# Patient Record
Sex: MALE | Race: WHITE
[De-identification: names, ages, dates, MRNs, and addresses within clinical notes are randomized per-mention and may not be internally consistent; named-entity substitution may affect disease eponyms.]

---

## 2017-06-02 ENCOUNTER — HOSPITAL ENCOUNTER (OUTPATIENT)
Dept: HOSPITAL 35 - RAD | Age: 72
End: 2017-06-02
Attending: INTERNAL MEDICINE
Payer: COMMERCIAL

## 2017-06-02 DIAGNOSIS — K46.9: Primary | ICD-10-CM

## 2017-06-02 DIAGNOSIS — K76.0: ICD-10-CM

## 2017-06-02 DIAGNOSIS — N20.0: ICD-10-CM

## 2017-07-21 ENCOUNTER — HOSPITAL ENCOUNTER (INPATIENT)
Dept: HOSPITAL 35 - TBA | Age: 72
LOS: 6 days | Discharge: HOME | DRG: 337 | End: 2017-07-27
Attending: SURGERY | Admitting: SURGERY
Payer: COMMERCIAL

## 2017-07-21 VITALS — DIASTOLIC BLOOD PRESSURE: 79 MMHG | SYSTOLIC BLOOD PRESSURE: 142 MMHG

## 2017-07-21 VITALS — SYSTOLIC BLOOD PRESSURE: 135 MMHG | DIASTOLIC BLOOD PRESSURE: 77 MMHG

## 2017-07-21 VITALS — BODY MASS INDEX: 34.3 KG/M2 | WEIGHT: 205.9 LBS | HEIGHT: 65 IN

## 2017-07-21 VITALS — DIASTOLIC BLOOD PRESSURE: 72 MMHG | SYSTOLIC BLOOD PRESSURE: 136 MMHG

## 2017-07-21 VITALS — SYSTOLIC BLOOD PRESSURE: 137 MMHG | DIASTOLIC BLOOD PRESSURE: 72 MMHG

## 2017-07-21 DIAGNOSIS — I10: ICD-10-CM

## 2017-07-21 DIAGNOSIS — E66.9: ICD-10-CM

## 2017-07-21 DIAGNOSIS — K21.9: ICD-10-CM

## 2017-07-21 DIAGNOSIS — K43.0: Primary | ICD-10-CM

## 2017-07-21 LAB
ANION GAP SERPL CALC-SCNC: 7 MMOL/L (ref 7–16)
APTT BLD: 27.3 SECONDS (ref 24.5–32.8)
BUN SERPL-MCNC: 18 MG/DL (ref 7–18)
CALCIUM SERPL-MCNC: 8.7 MG/DL (ref 8.5–10.1)
CHLORIDE SERPL-SCNC: 105 MMOL/L (ref 98–107)
CO2 SERPL-SCNC: 29 MMOL/L (ref 21–32)
CREAT SERPL-MCNC: 1.4 MG/DL (ref 0.7–1.3)
ERYTHROCYTE [DISTWIDTH] IN BLOOD BY AUTOMATED COUNT: 14.1 % (ref 10.5–14.5)
GLUCOSE SERPL-MCNC: 110 MG/DL (ref 74–106)
HCT VFR BLD CALC: 41.6 % (ref 42–52)
HGB BLD-MCNC: 14.1 GM/DL (ref 14–18)
INR PPP: 1
MCH RBC QN AUTO: 31.2 PG (ref 26–34)
MCHC RBC AUTO-ENTMCNC: 33.8 G/DL (ref 28–37)
MCV RBC: 92.3 FL (ref 80–100)
PLATELET # BLD: 152 THOU/UL (ref 150–400)
POTASSIUM SERPL-SCNC: 3.9 MMOL/L (ref 3.5–5.1)
PROTHROMBIN TIME: 10.4 SECONDS (ref 9.3–11.4)
RBC # BLD AUTO: 4.51 MIL/UL (ref 4.5–6)
SODIUM SERPL-SCNC: 141 MMOL/L (ref 136–145)
WBC # BLD AUTO: 5.7 THOU/UL (ref 4–11)

## 2017-07-21 PROCEDURE — 57092: CPT

## 2017-07-21 PROCEDURE — 50093: CPT

## 2017-07-21 PROCEDURE — 0WUF0JZ SUPPLEMENT ABDOMINAL WALL WITH SYNTHETIC SUBSTITUTE, OPEN APPROACH: ICD-10-PCS | Performed by: SURGERY

## 2017-07-21 PROCEDURE — 54026: CPT

## 2017-07-21 PROCEDURE — 50010 RENAL EXPLORATION: CPT

## 2017-07-21 PROCEDURE — 0DNF0ZZ RELEASE RIGHT LARGE INTESTINE, OPEN APPROACH: ICD-10-PCS | Performed by: SURGERY

## 2017-07-21 PROCEDURE — 50101: CPT

## 2017-07-21 PROCEDURE — 56530: CPT

## 2017-07-21 PROCEDURE — 50648: CPT

## 2017-07-21 PROCEDURE — 56524: CPT

## 2017-07-21 PROCEDURE — 54169: CPT

## 2017-07-21 PROCEDURE — 70005: CPT

## 2017-07-21 PROCEDURE — 56525: CPT

## 2017-07-21 PROCEDURE — 10047: CPT

## 2017-07-21 PROCEDURE — 62110: CPT

## 2017-07-21 PROCEDURE — 10785: CPT

## 2017-07-21 PROCEDURE — 56526: CPT

## 2017-07-21 PROCEDURE — 50455: CPT

## 2017-07-21 PROCEDURE — 0JX80ZZ TRANSFER ABDOMEN SUBCUTANEOUS TISSUE AND FASCIA, OPEN APPROACH: ICD-10-PCS | Performed by: SURGERY

## 2017-07-21 PROCEDURE — 54118: CPT

## 2017-07-21 PROCEDURE — 50386 REMOVE STENT VIA TRANSURETH: CPT

## 2017-07-21 PROCEDURE — 62900: CPT

## 2017-07-22 VITALS — SYSTOLIC BLOOD PRESSURE: 122 MMHG | DIASTOLIC BLOOD PRESSURE: 73 MMHG

## 2017-07-22 VITALS — DIASTOLIC BLOOD PRESSURE: 70 MMHG | SYSTOLIC BLOOD PRESSURE: 124 MMHG

## 2017-07-22 VITALS — SYSTOLIC BLOOD PRESSURE: 155 MMHG | DIASTOLIC BLOOD PRESSURE: 95 MMHG

## 2017-07-22 VITALS — SYSTOLIC BLOOD PRESSURE: 134 MMHG | DIASTOLIC BLOOD PRESSURE: 80 MMHG

## 2017-07-22 VITALS — DIASTOLIC BLOOD PRESSURE: 80 MMHG | SYSTOLIC BLOOD PRESSURE: 134 MMHG

## 2017-07-22 VITALS — DIASTOLIC BLOOD PRESSURE: 81 MMHG | SYSTOLIC BLOOD PRESSURE: 141 MMHG

## 2017-07-22 VITALS — DIASTOLIC BLOOD PRESSURE: 75 MMHG | SYSTOLIC BLOOD PRESSURE: 136 MMHG

## 2017-07-22 LAB
ANION GAP SERPL CALC-SCNC: 7 MMOL/L (ref 7–16)
BUN SERPL-MCNC: 15 MG/DL (ref 7–18)
CALCIUM SERPL-MCNC: 8.9 MG/DL (ref 8.5–10.1)
CHLORIDE SERPL-SCNC: 107 MMOL/L (ref 98–107)
CO2 SERPL-SCNC: 28 MMOL/L (ref 21–32)
CREAT SERPL-MCNC: 1.4 MG/DL (ref 0.7–1.3)
ERYTHROCYTE [DISTWIDTH] IN BLOOD BY AUTOMATED COUNT: 14.3 % (ref 10.5–14.5)
GLUCOSE SERPL-MCNC: 140 MG/DL (ref 74–106)
HCT VFR BLD CALC: 39.1 % (ref 42–52)
HGB BLD-MCNC: 13.2 GM/DL (ref 14–18)
MCH RBC QN AUTO: 31.4 PG (ref 26–34)
MCHC RBC AUTO-ENTMCNC: 33.8 G/DL (ref 28–37)
MCV RBC: 93 FL (ref 80–100)
PLATELET # BLD: 153 THOU/UL (ref 150–400)
POTASSIUM SERPL-SCNC: 4.2 MMOL/L (ref 3.5–5.1)
RBC # BLD AUTO: 4.21 MIL/UL (ref 4.5–6)
SODIUM SERPL-SCNC: 142 MMOL/L (ref 136–145)
WBC # BLD AUTO: 10.2 THOU/UL (ref 4–11)

## 2017-07-23 VITALS — SYSTOLIC BLOOD PRESSURE: 139 MMHG | DIASTOLIC BLOOD PRESSURE: 78 MMHG

## 2017-07-23 VITALS — DIASTOLIC BLOOD PRESSURE: 80 MMHG | SYSTOLIC BLOOD PRESSURE: 151 MMHG

## 2017-07-23 VITALS — DIASTOLIC BLOOD PRESSURE: 83 MMHG | SYSTOLIC BLOOD PRESSURE: 141 MMHG

## 2017-07-23 VITALS — SYSTOLIC BLOOD PRESSURE: 141 MMHG | DIASTOLIC BLOOD PRESSURE: 81 MMHG

## 2017-07-23 LAB
ANION GAP SERPL CALC-SCNC: 7 MMOL/L (ref 7–16)
ANISOCYTOSIS BLD QL SMEAR: SLIGHT
BASOPHILS NFR BLD AUTO: 0 % (ref 0–2)
BUN SERPL-MCNC: 13 MG/DL (ref 7–18)
CALCIUM SERPL-MCNC: 8.3 MG/DL (ref 8.5–10.1)
CHLORIDE SERPL-SCNC: 104 MMOL/L (ref 98–107)
CO2 SERPL-SCNC: 29 MMOL/L (ref 21–32)
CREAT SERPL-MCNC: 1.4 MG/DL (ref 0.7–1.3)
EOSINOPHIL NFR BLD: 3 % (ref 0–3)
ERYTHROCYTE [DISTWIDTH] IN BLOOD BY AUTOMATED COUNT: 14.3 % (ref 10.5–14.5)
GLUCOSE SERPL-MCNC: 109 MG/DL (ref 74–106)
GRANULOCYTES NFR BLD MANUAL: 69 % (ref 36–66)
HCT VFR BLD CALC: 36.8 % (ref 42–52)
HGB BLD-MCNC: 12.6 GM/DL (ref 14–18)
LYMPHOCYTES NFR BLD AUTO: 17 % (ref 24–44)
MANUAL DIFFERENTIAL PERFORMED BLD QL: YES
MCH RBC QN AUTO: 31.4 PG (ref 26–34)
MCHC RBC AUTO-ENTMCNC: 34.2 G/DL (ref 28–37)
MCV RBC: 91.9 FL (ref 80–100)
MONOCYTES NFR BLD: 10 % (ref 1–8)
NEUTROPHILS # BLD: 6.7 THOU/UL (ref 1.4–8.2)
NEUTS BAND NFR BLD: 1 % (ref 0–8)
PLATELET # BLD: 148 THOU/UL (ref 150–400)
POTASSIUM SERPL-SCNC: 3.7 MMOL/L (ref 3.5–5.1)
RBC # BLD AUTO: 4 MIL/UL (ref 4.5–6)
SODIUM SERPL-SCNC: 140 MMOL/L (ref 136–145)
TOTAL CELL COUNT: 100
WBC # BLD AUTO: 9.5 THOU/UL (ref 4–11)

## 2017-07-24 VITALS — SYSTOLIC BLOOD PRESSURE: 131 MMHG | DIASTOLIC BLOOD PRESSURE: 70 MMHG

## 2017-07-24 VITALS — DIASTOLIC BLOOD PRESSURE: 80 MMHG | SYSTOLIC BLOOD PRESSURE: 145 MMHG

## 2017-07-24 VITALS — SYSTOLIC BLOOD PRESSURE: 131 MMHG | DIASTOLIC BLOOD PRESSURE: 71 MMHG

## 2017-07-24 VITALS — SYSTOLIC BLOOD PRESSURE: 145 MMHG | DIASTOLIC BLOOD PRESSURE: 85 MMHG

## 2017-07-24 VITALS — SYSTOLIC BLOOD PRESSURE: 147 MMHG | DIASTOLIC BLOOD PRESSURE: 80 MMHG

## 2017-07-24 LAB
ANION GAP SERPL CALC-SCNC: 7 MMOL/L (ref 7–16)
BASOPHILS NFR BLD AUTO: 1 % (ref 0–2)
BUN SERPL-MCNC: 12 MG/DL (ref 7–18)
CALCIUM SERPL-MCNC: 8.5 MG/DL (ref 8.5–10.1)
CHLORIDE SERPL-SCNC: 101 MMOL/L (ref 98–107)
CO2 SERPL-SCNC: 29 MMOL/L (ref 21–32)
CREAT SERPL-MCNC: 1.3 MG/DL (ref 0.7–1.3)
EOSINOPHIL NFR BLD: 6 % (ref 0–3)
ERYTHROCYTE [DISTWIDTH] IN BLOOD BY AUTOMATED COUNT: 15 % (ref 10.5–14.5)
GLUCOSE SERPL-MCNC: 115 MG/DL (ref 74–106)
GRANULOCYTES NFR BLD MANUAL: 54 % (ref 36–66)
HCT VFR BLD CALC: 38.6 % (ref 42–52)
HGB BLD-MCNC: 13 GM/DL (ref 14–18)
LYMPHOCYTES NFR BLD AUTO: 28 % (ref 24–44)
MANUAL DIFFERENTIAL PERFORMED BLD QL: YES
MCH RBC QN AUTO: 31.2 PG (ref 26–34)
MCHC RBC AUTO-ENTMCNC: 33.6 G/DL (ref 28–37)
MCV RBC: 92.9 FL (ref 80–100)
MONOCYTES NFR BLD: 10 % (ref 1–8)
NEUTROPHILS # BLD: 4.6 THOU/UL (ref 1.4–8.2)
NEUTS BAND NFR BLD: 1 % (ref 0–8)
PLATELET # BLD EST: NORMAL 10*3/UL
PLATELET # BLD: 148 THOU/UL (ref 150–400)
POTASSIUM SERPL-SCNC: 3.7 MMOL/L (ref 3.5–5.1)
RBC # BLD AUTO: 4.16 MIL/UL (ref 4.5–6)
RBC MORPH BLD: NORMAL
SODIUM SERPL-SCNC: 137 MMOL/L (ref 136–145)
TOTAL CELL COUNT: 100
WBC # BLD AUTO: 8.3 THOU/UL (ref 4–11)

## 2017-07-25 VITALS — SYSTOLIC BLOOD PRESSURE: 130 MMHG | DIASTOLIC BLOOD PRESSURE: 77 MMHG

## 2017-07-25 VITALS — DIASTOLIC BLOOD PRESSURE: 75 MMHG | SYSTOLIC BLOOD PRESSURE: 111 MMHG

## 2017-07-25 VITALS — DIASTOLIC BLOOD PRESSURE: 72 MMHG | SYSTOLIC BLOOD PRESSURE: 141 MMHG

## 2017-07-25 VITALS — DIASTOLIC BLOOD PRESSURE: 72 MMHG | SYSTOLIC BLOOD PRESSURE: 148 MMHG

## 2017-07-26 VITALS — SYSTOLIC BLOOD PRESSURE: 153 MMHG | DIASTOLIC BLOOD PRESSURE: 68 MMHG

## 2017-07-26 VITALS — SYSTOLIC BLOOD PRESSURE: 129 MMHG | DIASTOLIC BLOOD PRESSURE: 78 MMHG

## 2017-07-26 VITALS — DIASTOLIC BLOOD PRESSURE: 58 MMHG | SYSTOLIC BLOOD PRESSURE: 127 MMHG

## 2017-07-26 VITALS — DIASTOLIC BLOOD PRESSURE: 78 MMHG | SYSTOLIC BLOOD PRESSURE: 120 MMHG

## 2017-07-27 VITALS — SYSTOLIC BLOOD PRESSURE: 135 MMHG | DIASTOLIC BLOOD PRESSURE: 58 MMHG

## 2017-07-27 VITALS — SYSTOLIC BLOOD PRESSURE: 116 MMHG | DIASTOLIC BLOOD PRESSURE: 58 MMHG

## 2018-02-14 ENCOUNTER — HOSPITAL ENCOUNTER (OUTPATIENT)
Dept: HOSPITAL 35 - CAT | Age: 73
End: 2018-02-14
Attending: INTERNAL MEDICINE
Payer: COMMERCIAL

## 2018-02-14 DIAGNOSIS — N20.0: ICD-10-CM

## 2018-02-14 DIAGNOSIS — K40.90: Primary | ICD-10-CM

## 2018-02-14 DIAGNOSIS — Z98.890: ICD-10-CM

## 2018-02-14 DIAGNOSIS — I70.0: ICD-10-CM

## 2018-02-14 DIAGNOSIS — I10: ICD-10-CM

## 2018-02-14 DIAGNOSIS — K44.9: ICD-10-CM

## 2018-02-14 DIAGNOSIS — M47.895: ICD-10-CM

## 2019-10-08 ENCOUNTER — HOSPITAL ENCOUNTER (OUTPATIENT)
Dept: HOSPITAL 35 - RAD | Age: 74
End: 2019-10-08
Attending: INTERNAL MEDICINE
Payer: COMMERCIAL

## 2019-10-08 DIAGNOSIS — M47.814: Primary | ICD-10-CM

## 2019-10-21 ENCOUNTER — HOSPITAL ENCOUNTER (OUTPATIENT)
Dept: HOSPITAL 35 - MRI | Age: 74
End: 2019-10-21
Attending: PSYCHIATRY & NEUROLOGY
Payer: COMMERCIAL

## 2019-10-21 DIAGNOSIS — G25.0: ICD-10-CM

## 2019-10-21 DIAGNOSIS — R90.82: ICD-10-CM

## 2019-10-21 DIAGNOSIS — G31.84: ICD-10-CM

## 2019-10-21 DIAGNOSIS — G47.33: ICD-10-CM

## 2019-10-21 DIAGNOSIS — S09.90XS: Primary | ICD-10-CM
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
## 2019-10-21 DIAGNOSIS — G31.9: ICD-10-CM

## 2019-10-21 LAB
FOLATE SERPL-MCNC: 13.3 NG/ML (ref 8.6–58.9)
TSH SERPL-ACNC: 3.58 UIU/ML (ref 0.36–3.74)
VIT B12 SERPL-MCNC: 666 PG/ML (ref 193–986)

## 2019-10-22 ENCOUNTER — HOSPITAL ENCOUNTER (OUTPATIENT)
Dept: HOSPITAL 35 - NEURO | Age: 74
End: 2019-10-22
Attending: PSYCHIATRY & NEUROLOGY
Payer: COMMERCIAL

## 2019-10-22 DIAGNOSIS — S09.90XS: ICD-10-CM

## 2019-10-22 DIAGNOSIS — G31.84: Primary | ICD-10-CM

## 2019-10-22 DIAGNOSIS — G25.0: ICD-10-CM

## 2019-11-01 NOTE — EEG
[facility]
[address]
[place]  [address]                      ELECTROENCEPHALOGRAM          
_______________________________________________________________________________
 
Name:       THANH KING                  Room #:                     REG [facility].#:      [id number]      Account #:      [id number]  
Admission:  10/22/19     Attend Phys:    Terrance Sparrow MD 
Discharge:               Date of Birth:  [birth year]  
                                                           Report #: [id number]
    [id number]  
_______________________________________________________________________________
THIS REPORT FOR:   //name//                          
 
CC: Marcos Sparrow
 
This patient is being evaluated for memory problems.  The EEG was done by
placing the electrode by standard 10-20 system of electrode placement.  Both
referential and sequential montages were used for recording.  Background
activity in this patient's EEG is about 9 Hz and 30 microvolt.  Photic
stimulation was unremarkable.  The patient became drowsy and that is associated
with bilateral slowing and vertex sharp waves.  Throughout the record, no active
epileptiform activity was noticed.
 
IMPRESSION:  This patient's EEG is intermixed with theta range slowing on both
sides.  That is a nonspecific finding.  Otherwise, the EEG was unremarkable.
 
 
 
 
 
 
 
 
 
 
 
 
 
 
 
 
 
 
 
 
 
 
 
 
 
 
 
 
 
       <ELECTRONICALLY SIGNED>
   By: Octaviano Magallanes MD         
  11/01/19     1417
D: 10/21/19 1808                           _____________________________________
T: 10/21/19 1822                           Octaviano Magallanes MD           /nt

## 2019-11-18 ENCOUNTER — HOSPITAL ENCOUNTER (OUTPATIENT)
Dept: HOSPITAL 35 - MRI | Age: 74
End: 2019-11-18
Attending: PSYCHIATRY & NEUROLOGY
Payer: COMMERCIAL

## 2019-11-18 DIAGNOSIS — I77.6: Primary | ICD-10-CM

## 2020-08-25 ENCOUNTER — HOSPITAL ENCOUNTER (OUTPATIENT)
Dept: HOSPITAL 35 - CV | Age: 75
End: 2020-08-25
Attending: PSYCHIATRY & NEUROLOGY
Payer: COMMERCIAL

## 2020-08-25 DIAGNOSIS — I63.9: Primary | ICD-10-CM

## 2020-08-25 NOTE — 2DMMODE
[facility]
[place]   [address]                   2 D/M-MODE ECHOCARDIOGRAM     
_______________________________________________________________________________
 
Name:       FERNANDOKEKECONSTANTIN EARLY                 Room #:                     REG [facility]#:      [id number]                       Account #:      [id number]  
Admission:  08/25/20    Attend Phys:    Octaviano Magallanes MD 
Discharge:              Date of Birth:  [birth year]  
                                                          Report #: [id number]
                                                                    [id number]
_______________________________________________________________________________
THIS REPORT FOR:  
 
cc:  Marcos Gibson MD, David W. MD Lammoglia, Francisco J. MD                                        
                                                                       ~
 
--------------- APPROVED REPORT --------------
 
 
Study performed:  08/25/2020 10:22:12
 
EXAM: Comprehensive 2D, Doppler, and color-flow 
Echocardiogram 
Patient Location: Out-Patient   
      Status:  routine
 
   BSA:         1.98
HR: 72 bpm  
 
Other Information 
Study Quality: Good
 
Indications
CVA
 
Echo Enhancing Agent
Indication: Rule out Shunt
Agent(s) / Amount(s) Used: Agitated Saline 7 cc
 
2D Dimensions
RVDd:  39.53 mm  
IVSd:  10.58 (7-11mm) LVOT Diam:  20.36 (18-24mm) 
LVDd:  48.52 mm  
PWd:  10.16 (7-11mm) Ascending Ao:  34.85 (22-36mm)
LVDs:  24.67 (25-40mm) 
Aortic Root:  33.82 mm 
 
Volumes
Left Atrial Volume (Systole) 
Single Plane 4CH:  53.43 mL Single Plane 2CH:  76.02 mL
    LA ESV Index:  36.00 mL/m2
 
Aortic Valve
AoV Peak Woody.:  1.37 m/s 
AO Peak Gr.:  7.51 mmHg  LVOT Max PG:  3.26 mmHg
 
 
[facility]
[address]
[place]  [address]
Phone:  [phone] 2 D/M-MODE ECHOCARDIOGRAM     
_______________________________________________________________________________
 
Name:            THANH KING                 Room #:                    [facility]#:           [id number]          Account #:     [id number]  
Admission:       08/25/20         Attend Phys:   Octaviano Magallanes,
Discharge:                  Date of Birth: [birth year]  
                         Report #:      [id number]
        [id number]
_______________________________________________________________________________
    LVOT Max V:  0.90 m/s
LEONARDO Vmax: 2.14 cm2  
 
Mitral Valve
    E/A Ratio:  1.4
    MV Decel. Time:  166.26 ms
MV E Max Woody.:  0.98 m/s 
MV A Woody.:  0.71 m/s  
MV PHT:  48.22 ms  
IVRT:  72.66 ms   
 
Pulmonary Valve
PV Peak Woody.:  0.89 m/s PV Peak Gr.:  3.15 mmHg
 
Pulmonary Vein
P Vein S:    0.67 m/s P Vein A:  0.29 m/s
P Vein D:   0.57 m/s P Vein A Dur.:  107.3 msec
P Vein S/D Ratio:  1.18 
 
Tricuspid Valve
TR Peak Woody.:  2.36 m/s  RAP Estimate:  5.00 mmHg
TR Peak Gr.:  22.27 mmHg 
    PA Pressure:  27.00 mmHg
 
Left Ventricle
The left ventricle is normal size. There is normal LV segmental wall 
motion. There is normal left ventricular wall thickness. Left 
ventricular systolic function is normal. LVEF is 55-60%. Moderate 
diastolic dysfunction is present (pseudonormal filling).
 
Right Ventricle
The right ventricle is normal size. The right ventricular systolic 
function is normal.
 
Atria
Left atrium is mildly dilated. No shunting noted with contrast bubble 
injection. The right atrium size is normal.
 
Aortic Valve
The aortic valve is normal in structure. Trace aortic regurgitation. 
There is no aortic valvular stenosis.
 
Mitral Valve
The mitral valve is normal in structure. Trace mitral regurgitation. 
No evidence of mitral valve stenosis.
 
 
 
[facility]
[address]
[place]  [address]
Phone:  [phone]                    2 D/M-MODE ECHOCARDIOGRAM     
_______________________________________________________________________________
 
Name:            THANH KING                 Room #:                    REG [facility]#:           [id number]          Account #:     [id number]  
Admission:       08/25/20         Attend Phys:   Octaviano Magallanes,
Discharge:                  Date of Birth: [birth year]  
                         Report #:      [id number]
        [id number]
_______________________________________________________________________________
Tricuspid Valve
The tricuspid valve is normal in structure. Trace tricuspid 
regurgitation. Estimated PAP is 25-30mmHg.
 
Pulmonic Valve
The pulmonary valve is normal in structure. Trace pulmonic 
regurgitation.
 
Great Vessels
The aortic root is normal in size. The ascending aorta is normal in 
size. IVC is normal in size and collapses >50% with 
inspiration.
 
Pericardium
There is no pericardial effusion.
 
<Conclusion>
The left ventricle is normal size.
LVEF is 55-60%.
Left atrium is mildly dilated.
The aortic valve is normal in structure.
Trace aortic regurgitation.
The mitral valve is normal in structure.
Trace mitral regurgitation.
The tricuspid valve is normal in structure.
Trace tricuspid regurgitation. Estimated PAP is 25-30mmHg.
The pulmonary valve is normal in structure.
Trace pulmonic regurgitation.
There is no pericardial effusion.
No shunting noted with contrast bubble injection.
 
 
 
 
 
 
 
 
 
 
 
 
 
 
  <ELECTRONICALLY SIGNED>
   By: Miky Adams MD    
  08/25/20     1102
D: 08/25/20 1102                           _____________________________________
T: 08/25/20 1102                           Miky Adams MD      /INF